# Patient Record
Sex: FEMALE | Race: WHITE | NOT HISPANIC OR LATINO | URBAN - METROPOLITAN AREA
[De-identification: names, ages, dates, MRNs, and addresses within clinical notes are randomized per-mention and may not be internally consistent; named-entity substitution may affect disease eponyms.]

---

## 2017-02-14 ENCOUNTER — OFFICE (OUTPATIENT)
Dept: URBAN - METROPOLITAN AREA CLINIC 11 | Facility: CLINIC | Age: 72
End: 2017-02-14
Payer: COMMERCIAL

## 2017-02-14 VITALS
HEART RATE: 63 BPM | WEIGHT: 164 LBS | SYSTOLIC BLOOD PRESSURE: 153 MMHG | DIASTOLIC BLOOD PRESSURE: 86 MMHG | HEIGHT: 64 IN | RESPIRATION RATE: 16 BRPM

## 2017-02-14 DIAGNOSIS — L29.8 OTHER PRURITUS: ICD-10-CM

## 2017-02-14 DIAGNOSIS — K74.3 PRIMARY BILIARY CIRRHOSIS: ICD-10-CM

## 2017-02-14 DIAGNOSIS — I25.10 ATHEROSCLEROTIC HEART DISEASE OF NATIVE CORONARY ARTERY WITH: ICD-10-CM

## 2017-02-14 DIAGNOSIS — E03.9 HYPOTHYROIDISM, UNSPECIFIED: ICD-10-CM

## 2017-02-14 DIAGNOSIS — N18.9 CHRONIC KIDNEY DISEASE, UNSPECIFIED: ICD-10-CM

## 2017-02-14 PROCEDURE — G8427 DOCREV CUR MEDS BY ELIG CLIN: HCPCS

## 2017-02-14 PROCEDURE — 99214 OFFICE O/P EST MOD 30 MIN: CPT

## 2017-02-14 NOTE — SERVICEHPINOTES
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, she had ran out of her hamlet her with her insurance and she is now back: She was doing well her tests were essentially normal but albumin 3.2 I could not figure out a Global  PBC score on her but her meld was low 8.5 however she had a heart attack in January and now is on Plavix and aspirin she said she one now but was not resuscitated was normal on the respirator now has some mild to moderate renal insufficiency looking at her blood urine is no peripheral extra protein and there were no red cells her GFR was 40 her renal function studies were normalin nov she has itching and a right which could be due to her PBC or her renal disease discussed using Sarna lotion may have to use urease very concerned will need to put the renal and biliary disease together

## 2017-02-17 LAB
BMP WITH ESTIMATED GFR,CALCULATED: CALCIUM TOTAL: 9.3 MG/DL (ref 8.5–10.5)
BMP WITH ESTIMATED GFR,CALCULATED: CARBON DIOXIDE: 26 MEQ/L (ref 21–31)
BMP WITH ESTIMATED GFR,CALCULATED: CHLORIDE: 107 MEQ/L — HIGH (ref 96–106)
BMP WITH ESTIMATED GFR,CALCULATED: CREATININE: 1.16 MG/DL (ref 0.6–1.3)
BMP WITH ESTIMATED GFR,CALCULATED: EGFR AFRICAN AMERICAN CALC: 55 ML/MIN/1.73M'2 — LOW
BMP WITH ESTIMATED GFR,CALCULATED: EGFR NON-AFRICAN AMERICAN CALC: 48 ML/MIN/1.73M'2 — LOW
BMP WITH ESTIMATED GFR,CALCULATED: FASTING/NON-FASTING: (no result)
BMP WITH ESTIMATED GFR,CALCULATED: GLUCOSE: 114 MG/DL — HIGH (ref 65–100)
BMP WITH ESTIMATED GFR,CALCULATED: POTASSIUM: 3.6 MEQ/ML (ref 3.5–5.4)
BMP WITH ESTIMATED GFR,CALCULATED: SODIUM: 146 MEQ/L — HIGH (ref 135–145)
BMP WITH ESTIMATED GFR,CALCULATED: UREA NITROGEN: 14 MG/DL (ref 8–23)
CBCI COMPLETE BLOOD COUNT W/ DIFF: ABS BASOPHILS: 0 K/UL (ref 0–0.3)
CBCI COMPLETE BLOOD COUNT W/ DIFF: ABS EOSINOPHILS: 0.2 K/UL (ref 0.05–0.5)
CBCI COMPLETE BLOOD COUNT W/ DIFF: ABS LYMPHOCYTES: 2.8 K/UL (ref 1–4)
CBCI COMPLETE BLOOD COUNT W/ DIFF: ABS MONOCYTES: 0.5 K/UL (ref 0.1–1.1)
CBCI COMPLETE BLOOD COUNT W/ DIFF: ABS NEUTROPHILS: 3.1 K/UL (ref 1.8–7)
CBCI COMPLETE BLOOD COUNT W/ DIFF: BASOPHILS: 0.5 % (ref 0–3)
CBCI COMPLETE BLOOD COUNT W/ DIFF: EOSINOPHILS: 3.5 % (ref 1–7)
CBCI COMPLETE BLOOD COUNT W/ DIFF: HEMATOCRIT: 35.4 % — LOW (ref 36–48)
CBCI COMPLETE BLOOD COUNT W/ DIFF: HEMOGLOBIN: 11.9 G/DL — LOW (ref 12–16)
CBCI COMPLETE BLOOD COUNT W/ DIFF: LYMPHOCYTES: 42 % (ref 20–48)
CBCI COMPLETE BLOOD COUNT W/ DIFF: MCH: 28.9 PG (ref 25–35)
CBCI COMPLETE BLOOD COUNT W/ DIFF: MCHC: 33.6 % (ref 30–38)
CBCI COMPLETE BLOOD COUNT W/ DIFF: MCV: 85.9 FL (ref 78–102)
CBCI COMPLETE BLOOD COUNT W/ DIFF: MONOCYTES: 7.5 % (ref 3–14)
CBCI COMPLETE BLOOD COUNT W/ DIFF: NEUTROPHILS: 46.5 % (ref 40–70)
CBCI COMPLETE BLOOD COUNT W/ DIFF: PLATELET COUNT: 245 K/UL (ref 150–450)
CBCI COMPLETE BLOOD COUNT W/ DIFF: RBC DISTRIBUTION WIDTH: 14.7 % (ref 11.5–16)
CBCI COMPLETE BLOOD COUNT W/ DIFF: RED BLOOD CELL COUNT: 4.12 M/UL (ref 4–5.5)
CBCI COMPLETE BLOOD COUNT W/ DIFF: WHITE BLOOD CELL COUNT: 6.7 K/UL (ref 4–11)
FLUORESCENT ANTINUCLEAR ANTIBODY: ANTINUCLEAR ANTIBODY: POSITIVE
FLUORESCENT ANTINUCLEAR ANTIBODY: PATTERN/TITER: (no result)
GAMMA GLUTAMYL TRANSFERASE: GGT: 22 U/L (ref 9–64)
HEPATIC FUNCTION PANEL A: ALBUMIN: 4 G/DL (ref 3.5–5.2)
HEPATIC FUNCTION PANEL A: ALKALINE PHOSPHATASE: 145 U/L — HIGH (ref 34–115)
HEPATIC FUNCTION PANEL A: DIRECT BILIRUBIN: 0.2 MG/DL (ref 0–0.2)
HEPATIC FUNCTION PANEL A: SGOT (AST): 24 U/L (ref 13–40)
HEPATIC FUNCTION PANEL A: SGPT (ALT): 18 U/L (ref 7–52)
HEPATIC FUNCTION PANEL A: TOTAL BILIRUBIN: 0.6 MG/DL (ref 0.3–1.2)
HEPATIC FUNCTION PANEL A: TOTAL PROTEIN: 7.2 G/DL (ref 6.4–8.3)
IMMUNOGLOBULIN G: 1166 MG/DL (ref 700–1600)
INSULIN: 16.7 UU/ML (ref 2.6–24.9)
Lab: (no result)
Lab: 1
PROTEIN ELECTROPHORESIS (SERUM): ALBUMIN: 3.21
PROTEIN ELECTROPHORESIS (SERUM): ALPHA-1: 0.33
PROTEIN ELECTROPHORESIS (SERUM): ALPHA-2: 0.83
PROTEIN ELECTROPHORESIS (SERUM): BETA: 1.04
PROTEIN ELECTROPHORESIS (SERUM): GAMMA: 1.45
PROTEIN ELECTROPHORESIS (SERUM): INTERPRETATION: (no result)
PROTEIN ELECTROPHORESIS (SERUM): TOTAL PROTEIN: 6.9
PROTHROMBIN TIME: INR VALUE: 0.99
PROTHROMBIN TIME: MEAN NORMAL: 13.1 SECONDS
PROTHROMBIN TIME: PATIENT: 13 SECONDS (ref 12.1–14.2)
URINALYSIS W/MICROSCOPIC: BACTERIA: (no result)
URINALYSIS W/MICROSCOPIC: BILIRUBIN: NEGATIVE
URINALYSIS W/MICROSCOPIC: BLOOD: ABNORMAL
URINALYSIS W/MICROSCOPIC: GLUCOSE: NEGATIVE
URINALYSIS W/MICROSCOPIC: HYALINE CAST: 1
URINALYSIS W/MICROSCOPIC: KETONES, URINE: NEGATIVE
URINALYSIS W/MICROSCOPIC: LEUKOCYTE ESTERASE: POSITIVE
URINALYSIS W/MICROSCOPIC: MUCOUS: (no result)
URINALYSIS W/MICROSCOPIC: NITRITES: NEGATIVE
URINALYSIS W/MICROSCOPIC: PH,URINE: 5 (ref 4.6–8.5)
URINALYSIS W/MICROSCOPIC: PROTEIN: NEGATIVE
URINALYSIS W/MICROSCOPIC: RBC: 5 CELLS/HPF — HIGH (ref 0–4)
URINALYSIS W/MICROSCOPIC: SPECIFIC GRAVITY: 1.02 (ref 1–1.03)
URINALYSIS W/MICROSCOPIC: SQUAMOUS EPITHELIAL CELL: 1 /HPF
URINALYSIS W/MICROSCOPIC: URINE CLARITY: (no result)
URINALYSIS W/MICROSCOPIC: URINE COLOR: YELLOW
URINALYSIS W/MICROSCOPIC: WBC: 97 CELLS/HPF — HIGH (ref 0–2)
URINE CULTURE: C URINE: (no result)

## 2017-03-31 ENCOUNTER — OFFICE (OUTPATIENT)
Dept: URBAN - METROPOLITAN AREA CLINIC 11 | Facility: CLINIC | Age: 72
End: 2017-03-31
Payer: COMMERCIAL

## 2017-03-31 VITALS
DIASTOLIC BLOOD PRESSURE: 90 MMHG | WEIGHT: 158 LBS | RESPIRATION RATE: 16 BRPM | SYSTOLIC BLOOD PRESSURE: 161 MMHG | HEART RATE: 60 BPM | HEIGHT: 64 IN

## 2017-03-31 DIAGNOSIS — K74.3 PRIMARY BILIARY CIRRHOSIS: ICD-10-CM

## 2017-03-31 DIAGNOSIS — R93.8 ABNORMAL FINDINGS ON DIAGNOSTIC IMAGING OF OTHER SPECIFIED B: ICD-10-CM

## 2017-03-31 DIAGNOSIS — N18.9 CHRONIC KIDNEY DISEASE, UNSPECIFIED: ICD-10-CM

## 2017-03-31 PROCEDURE — G8427 DOCREV CUR MEDS BY ELIG CLIN: HCPCS

## 2017-03-31 PROCEDURE — 99214 OFFICE O/P EST MOD 30 MIN: CPT

## 2017-04-02 LAB
ALPHA-FETOPROTEIN TUMOR MARKER: AFP - TUMOR MARKER: 2.8 NG/ML (ref 0–9)
BMP WITH ESTIMATED GFR,CALCULATED: CALCIUM TOTAL: 9 MG/DL (ref 8.5–10.5)
BMP WITH ESTIMATED GFR,CALCULATED: CARBON DIOXIDE: 29 MEQ/L (ref 21–31)
BMP WITH ESTIMATED GFR,CALCULATED: CHLORIDE: 102 MEQ/L (ref 96–106)
BMP WITH ESTIMATED GFR,CALCULATED: CREATININE: 1.28 MG/DL (ref 0.6–1.3)
BMP WITH ESTIMATED GFR,CALCULATED: EGFR AFRICAN AMERICAN CALC: 49 ML/MIN/1.73M'2 — LOW
BMP WITH ESTIMATED GFR,CALCULATED: EGFR NON-AFRICAN AMERICAN CALC: 42 ML/MIN/1.73M'2 — LOW
BMP WITH ESTIMATED GFR,CALCULATED: FASTING/NON-FASTING: (no result)
BMP WITH ESTIMATED GFR,CALCULATED: GLUCOSE: 97 MG/DL (ref 65–100)
BMP WITH ESTIMATED GFR,CALCULATED: POTASSIUM: 3.1 MEQ/ML — LOW (ref 3.5–5.4)
BMP WITH ESTIMATED GFR,CALCULATED: SODIUM: 143 MEQ/L (ref 135–145)
BMP WITH ESTIMATED GFR,CALCULATED: UREA NITROGEN: 14 MG/DL (ref 8–23)
CA 19-9: CA199 (ROCHE): 10 U/ML
CBCI COMPLETE BLOOD COUNT W/ DIFF: ABS BASOPHILS: 0 K/UL (ref 0–0.3)
CBCI COMPLETE BLOOD COUNT W/ DIFF: ABS EOSINOPHILS: 0.2 K/UL (ref 0.05–0.5)
CBCI COMPLETE BLOOD COUNT W/ DIFF: ABS LYMPHOCYTES: 2.8 K/UL (ref 1–4)
CBCI COMPLETE BLOOD COUNT W/ DIFF: ABS MONOCYTES: 0.4 K/UL (ref 0.1–1.1)
CBCI COMPLETE BLOOD COUNT W/ DIFF: ABS NEUTROPHILS: 2.3 K/UL (ref 1.8–7)
CBCI COMPLETE BLOOD COUNT W/ DIFF: BASOPHILS: 0.5 % (ref 0–3)
CBCI COMPLETE BLOOD COUNT W/ DIFF: EOSINOPHILS: 3.2 % (ref 1–7)
CBCI COMPLETE BLOOD COUNT W/ DIFF: HEMATOCRIT: 38.5 % (ref 36–48)
CBCI COMPLETE BLOOD COUNT W/ DIFF: HEMOGLOBIN: 12.4 G/DL (ref 12–16)
CBCI COMPLETE BLOOD COUNT W/ DIFF: LYMPHOCYTES: 49.8 % — HIGH (ref 20–48)
CBCI COMPLETE BLOOD COUNT W/ DIFF: MCH: 28 PG (ref 25–35)
CBCI COMPLETE BLOOD COUNT W/ DIFF: MCHC: 32.2 % (ref 30–38)
CBCI COMPLETE BLOOD COUNT W/ DIFF: MCV: 86.9 FL (ref 78–102)
CBCI COMPLETE BLOOD COUNT W/ DIFF: MONOCYTES: 6.9 % (ref 3–14)
CBCI COMPLETE BLOOD COUNT W/ DIFF: NEUTROPHILS: 39.6 % — LOW (ref 40–70)
CBCI COMPLETE BLOOD COUNT W/ DIFF: PLATELET COUNT: 248 K/UL (ref 150–450)
CBCI COMPLETE BLOOD COUNT W/ DIFF: RBC DISTRIBUTION WIDTH: 14.4 % (ref 11.5–16)
CBCI COMPLETE BLOOD COUNT W/ DIFF: RED BLOOD CELL COUNT: 4.43 M/UL (ref 4–5.5)
CBCI COMPLETE BLOOD COUNT W/ DIFF: WHITE BLOOD CELL COUNT: 5.7 K/UL (ref 4–11)
GAMMA GLUTAMYL TRANSFERASE: GGT: 19 U/L (ref 9–64)
HEPATIC FUNCTION PANEL A: ALBUMIN: 4.1 G/DL (ref 3.5–5.2)
HEPATIC FUNCTION PANEL A: ALKALINE PHOSPHATASE: 159 U/L — HIGH (ref 38–146)
HEPATIC FUNCTION PANEL A: DIRECT BILIRUBIN: 0.2 MG/DL (ref 0–0.2)
HEPATIC FUNCTION PANEL A: SGOT (AST): 23 U/L (ref 13–40)
HEPATIC FUNCTION PANEL A: SGPT (ALT): 15 U/L (ref 7–52)
HEPATIC FUNCTION PANEL A: TOTAL BILIRUBIN: 0.5 MG/DL (ref 0.3–1.2)
HEPATIC FUNCTION PANEL A: TOTAL PROTEIN: 7.2 G/DL (ref 6.4–8.3)
IMMUNOGLOBULIN M: 95 MG/DL (ref 40–230)
Lab: (no result) /HPF
Lab: <1
Lab: <1
PROTHROMBIN TIME: INR VALUE: 1.02
PROTHROMBIN TIME: MEAN NORMAL: 13.1 SECONDS
PROTHROMBIN TIME: PATIENT: 13.3 SECONDS (ref 12.1–14.2)
URINALYSIS W/MICROSCOPIC: BACTERIA: 0
URINALYSIS W/MICROSCOPIC: BILIRUBIN: NEGATIVE
URINALYSIS W/MICROSCOPIC: BLOOD: NEGATIVE
URINALYSIS W/MICROSCOPIC: GLUCOSE: NEGATIVE
URINALYSIS W/MICROSCOPIC: KETONES, URINE: NEGATIVE
URINALYSIS W/MICROSCOPIC: LEUKOCYTE ESTERASE: POSITIVE
URINALYSIS W/MICROSCOPIC: MUCOUS: (no result)
URINALYSIS W/MICROSCOPIC: NITRITES: NEGATIVE
URINALYSIS W/MICROSCOPIC: PH,URINE: 6 (ref 4.6–8.5)
URINALYSIS W/MICROSCOPIC: PROTEIN: NEGATIVE
URINALYSIS W/MICROSCOPIC: RBC: 1 CELLS/HPF (ref 0–4)
URINALYSIS W/MICROSCOPIC: SPECIFIC GRAVITY: 1.02 (ref 1–1.03)
URINALYSIS W/MICROSCOPIC: SQUAMOUS EPITHELIAL CELL: 1 /HPF
URINALYSIS W/MICROSCOPIC: URINE CLARITY: (no result)
URINALYSIS W/MICROSCOPIC: URINE COLOR: YELLOW
URINALYSIS W/MICROSCOPIC: WBC: 7 CELLS/HPF — HIGH (ref 0–2)

## 2017-07-07 ENCOUNTER — OFFICE (OUTPATIENT)
Dept: URBAN - METROPOLITAN AREA CLINIC 11 | Facility: CLINIC | Age: 72
End: 2017-07-07
Payer: COMMERCIAL

## 2017-07-07 VITALS
HEIGHT: 64 IN | DIASTOLIC BLOOD PRESSURE: 80 MMHG | HEART RATE: 62 BPM | WEIGHT: 153 LBS | SYSTOLIC BLOOD PRESSURE: 125 MMHG

## 2017-07-07 DIAGNOSIS — K74.3 PRIMARY BILIARY CIRRHOSIS: ICD-10-CM

## 2017-07-07 DIAGNOSIS — E03.9 HYPOTHYROIDISM, UNSPECIFIED: ICD-10-CM

## 2017-07-07 DIAGNOSIS — G47.429: ICD-10-CM

## 2017-07-07 DIAGNOSIS — N18.9 CHRONIC KIDNEY DISEASE, UNSPECIFIED: ICD-10-CM

## 2017-07-07 PROCEDURE — 99214 OFFICE O/P EST MOD 30 MIN: CPT

## 2017-07-07 PROCEDURE — G8427 DOCREV CUR MEDS BY ELIG CLIN: HCPCS

## 2017-07-08 LAB
BASIC METABOLIC PANEL: CALCIUM TOTAL: 9.1 MG/DL (ref 8.5–10.5)
BASIC METABOLIC PANEL: CARBON DIOXIDE: 29 MEQ/L (ref 21–31)
BASIC METABOLIC PANEL: CHLORIDE: 102 MEQ/L (ref 96–106)
BASIC METABOLIC PANEL: CREATININE: 1.12 MG/DL (ref 0.6–1.3)
BASIC METABOLIC PANEL: GLUCOSE: 122 MG/DL — HIGH (ref 65–100)
BASIC METABOLIC PANEL: POTASSIUM: 3.6 MEQ/ML (ref 3.5–5.4)
BASIC METABOLIC PANEL: SODIUM: 143 MEQ/L (ref 135–145)
BASIC METABOLIC PANEL: UREA NITROGEN: 12 MG/DL (ref 8–23)
CBCI COMPLETE BLOOD COUNT W/ DIFF: ABS BASOPHILS: 0 K/UL (ref 0–0.3)
CBCI COMPLETE BLOOD COUNT W/ DIFF: ABS EOSINOPHILS: 0.3 K/UL (ref 0.05–0.5)
CBCI COMPLETE BLOOD COUNT W/ DIFF: ABS LYMPHOCYTES: 3 K/UL (ref 1–4)
CBCI COMPLETE BLOOD COUNT W/ DIFF: ABS MONOCYTES: 0.7 K/UL (ref 0.1–1.1)
CBCI COMPLETE BLOOD COUNT W/ DIFF: ABS NEUTROPHILS: 1.8 K/UL (ref 1.8–7)
CBCI COMPLETE BLOOD COUNT W/ DIFF: BASOPHILS: 0.3 % (ref 0–3)
CBCI COMPLETE BLOOD COUNT W/ DIFF: EOSINOPHILS: 4.7 % (ref 1–7)
CBCI COMPLETE BLOOD COUNT W/ DIFF: HEMATOCRIT: 37.6 % (ref 36–48)
CBCI COMPLETE BLOOD COUNT W/ DIFF: HEMOGLOBIN: 12.1 G/DL (ref 12–16)
CBCI COMPLETE BLOOD COUNT W/ DIFF: LYMPHOCYTES: 52.3 % — HIGH (ref 20–48)
CBCI COMPLETE BLOOD COUNT W/ DIFF: MCH: 27.7 PG (ref 25–35)
CBCI COMPLETE BLOOD COUNT W/ DIFF: MCHC: 32.2 % (ref 30–38)
CBCI COMPLETE BLOOD COUNT W/ DIFF: MCV: 86 FL (ref 78–102)
CBCI COMPLETE BLOOD COUNT W/ DIFF: MONOCYTES: 11.2 % (ref 3–14)
CBCI COMPLETE BLOOD COUNT W/ DIFF: NEUTROPHILS: 31.5 % — LOW (ref 40–70)
CBCI COMPLETE BLOOD COUNT W/ DIFF: PLATELET COUNT: 278 K/UL (ref 150–450)
CBCI COMPLETE BLOOD COUNT W/ DIFF: RBC DISTRIBUTION WIDTH: 16 % (ref 11.5–16)
CBCI COMPLETE BLOOD COUNT W/ DIFF: RED BLOOD CELL COUNT: 4.37 M/UL (ref 4–5.5)
CBCI COMPLETE BLOOD COUNT W/ DIFF: WHITE BLOOD CELL COUNT: 5.8 K/UL (ref 4–11)
GAMMA GLUTAMYL TRANSFERASE: GGT: 15 U/L (ref 9–64)
HEPATIC FUNCTION PANEL A: ALBUMIN: 3.8 G/DL (ref 3.5–5.2)
HEPATIC FUNCTION PANEL A: ALKALINE PHOSPHATASE: 183 U/L — HIGH (ref 38–146)
HEPATIC FUNCTION PANEL A: DIRECT BILIRUBIN: 0.1 MG/DL (ref 0–0.2)
HEPATIC FUNCTION PANEL A: SGOT (AST): 23 U/L (ref 13–40)
HEPATIC FUNCTION PANEL A: SGPT (ALT): 15 U/L (ref 7–52)
HEPATIC FUNCTION PANEL A: TOTAL BILIRUBIN: 0.3 MG/DL (ref 0.3–1.2)
HEPATIC FUNCTION PANEL A: TOTAL PROTEIN: 6.6 G/DL (ref 6.4–8.3)
IMMUNOGLOBULIN M: 79 MG/DL (ref 40–230)
PROTHROMBIN TIME: INR VALUE: 0.98
PROTHROMBIN TIME: MEAN NORMAL: 13.1 SECONDS
PROTHROMBIN TIME: PATIENT: 12.8 SECONDS (ref 12.1–14.2)
T4-THYROXINE: T4 (THYROXINE): 8.8 UG/DL (ref 4.5–12)
THYROID STIMULATING HORMONE: TSH: 0.27 MIU/L — LOW (ref 0.3–4.2)

## 2017-10-09 ENCOUNTER — OFFICE (OUTPATIENT)
Dept: URBAN - METROPOLITAN AREA CLINIC 11 | Facility: CLINIC | Age: 72
End: 2017-10-09
Payer: COMMERCIAL

## 2017-10-09 VITALS
HEART RATE: 62 BPM | SYSTOLIC BLOOD PRESSURE: 150 MMHG | WEIGHT: 166 LBS | DIASTOLIC BLOOD PRESSURE: 84 MMHG | HEIGHT: 64 IN

## 2017-10-09 DIAGNOSIS — N18.9 CHRONIC KIDNEY DISEASE, UNSPECIFIED: ICD-10-CM

## 2017-10-09 DIAGNOSIS — E03.9 HYPOTHYROIDISM, UNSPECIFIED: ICD-10-CM

## 2017-10-09 DIAGNOSIS — K74.3 PRIMARY BILIARY CIRRHOSIS: ICD-10-CM

## 2017-10-09 DIAGNOSIS — K86.2 CYST OF PANCREAS: ICD-10-CM

## 2017-10-09 LAB
CBC WITH DIFFERENTIAL/PLATELET: BASO (ABSOLUTE): 0 X10E3/UL (ref 0–0.2)
CBC WITH DIFFERENTIAL/PLATELET: BASOS: 1 %
CBC WITH DIFFERENTIAL/PLATELET: EOS (ABSOLUTE): 0.3 X10E3/UL (ref 0–0.4)
CBC WITH DIFFERENTIAL/PLATELET: EOS: 5 %
CBC WITH DIFFERENTIAL/PLATELET: HEMATOCRIT: 36.1 % (ref 34–46.6)
CBC WITH DIFFERENTIAL/PLATELET: HEMOGLOBIN: 11.1 G/DL (ref 11.1–15.9)
CBC WITH DIFFERENTIAL/PLATELET: IMMATURE GRANS (ABS): 0 X10E3/UL (ref 0–0.1)
CBC WITH DIFFERENTIAL/PLATELET: IMMATURE GRANULOCYTES: 0 %
CBC WITH DIFFERENTIAL/PLATELET: LYMPHS (ABSOLUTE): 2.6 X10E3/UL (ref 0.7–3.1)
CBC WITH DIFFERENTIAL/PLATELET: LYMPHS: 44 %
CBC WITH DIFFERENTIAL/PLATELET: MCH: 27.2 PG (ref 26.6–33)
CBC WITH DIFFERENTIAL/PLATELET: MCHC: 30.7 G/DL — LOW (ref 31.5–35.7)
CBC WITH DIFFERENTIAL/PLATELET: MCV: 89 FL (ref 79–97)
CBC WITH DIFFERENTIAL/PLATELET: MONOCYTES(ABSOLUTE): 0.4 X10E3/UL (ref 0.1–0.9)
CBC WITH DIFFERENTIAL/PLATELET: MONOCYTES: 7 %
CBC WITH DIFFERENTIAL/PLATELET: NEUTROPHILS (ABSOLUTE): 2.6 X10E3/UL (ref 1.4–7)
CBC WITH DIFFERENTIAL/PLATELET: NEUTROPHILS: 43 %
CBC WITH DIFFERENTIAL/PLATELET: PLATELETS: 254 X10E3/UL (ref 150–379)
CBC WITH DIFFERENTIAL/PLATELET: RBC: 4.08 X10E6/UL (ref 3.77–5.28)
CBC WITH DIFFERENTIAL/PLATELET: RDW: 15.1 % (ref 12.3–15.4)
CBC WITH DIFFERENTIAL/PLATELET: WBC: 6 X10E3/UL (ref 3.4–10.8)
GGT: 18 IU/L (ref 0–60)
HEPATIC FUNCTION PANEL (7): ALBUMIN, SERUM: 3.6 G/DL (ref 3.5–4.8)
HEPATIC FUNCTION PANEL (7): ALKALINE PHOSPHATASE, S: 184 IU/L — HIGH (ref 39–117)
HEPATIC FUNCTION PANEL (7): ALT (SGPT): 21 IU/L (ref 0–32)
HEPATIC FUNCTION PANEL (7): AST (SGOT): 32 IU/L (ref 0–40)
HEPATIC FUNCTION PANEL (7): BILIRUBIN, DIRECT: 0.13 MG/DL (ref 0–0.4)
HEPATIC FUNCTION PANEL (7): BILIRUBIN, TOTAL: 0.4 MG/DL (ref 0–1.2)
HEPATIC FUNCTION PANEL (7): PROTEIN, TOTAL, SERUM: 6.9 G/DL (ref 6–8.5)
IMMUNOGLOBULIN M, QN, SERUM: 85 MG/DL (ref 26–217)
PROTHROMBIN TIME (PT): INR: 1 (ref 0.8–1.2)
PROTHROMBIN TIME (PT): PROTHROMBIN TIME: 9.9 SEC (ref 9.1–12)
VITAMIN D, 25-HYDROXY: 57.4 NG/ML (ref 30–100)

## 2017-10-09 PROCEDURE — 99214 OFFICE O/P EST MOD 30 MIN: CPT

## 2017-10-09 PROCEDURE — G8427 DOCREV CUR MEDS BY ELIG CLIN: HCPCS

## 2018-04-20 ENCOUNTER — OFFICE (OUTPATIENT)
Dept: URBAN - METROPOLITAN AREA CLINIC 11 | Facility: CLINIC | Age: 73
End: 2018-04-20
Payer: COMMERCIAL

## 2018-04-20 VITALS
HEIGHT: 64 IN | HEART RATE: 69 BPM | SYSTOLIC BLOOD PRESSURE: 127 MMHG | WEIGHT: 170 LBS | DIASTOLIC BLOOD PRESSURE: 82 MMHG

## 2018-04-20 DIAGNOSIS — K74.3 PRIMARY BILIARY CIRRHOSIS: ICD-10-CM

## 2018-04-20 DIAGNOSIS — K21.9 GASTRO-ESOPHAGEAL REFLUX DISEASE WITHOUT ESOPHAGITIS: ICD-10-CM

## 2018-04-20 DIAGNOSIS — E55.9 VITAMIN D DEFICIENCY, UNSPECIFIED: ICD-10-CM

## 2018-04-20 DIAGNOSIS — E03.9 HYPOTHYROIDISM, UNSPECIFIED: ICD-10-CM

## 2018-04-20 LAB
BASIC METABOLIC PANEL (8): BUN/CREATININE RATIO: 13 (ref 12–28)
BASIC METABOLIC PANEL (8): BUN: 16 MG/DL (ref 8–27)
BASIC METABOLIC PANEL (8): CALCIUM: 9.8 MG/DL (ref 8.7–10.3)
BASIC METABOLIC PANEL (8): CARBON DIOXIDE, TOTAL: 25 MMOL/L (ref 18–29)
BASIC METABOLIC PANEL (8): CHLORIDE: 107 MMOL/L — HIGH (ref 96–106)
BASIC METABOLIC PANEL (8): CREATININE: 1.19 MG/DL — HIGH (ref 0.57–1)
BASIC METABOLIC PANEL (8): EGFR IF AFRICN AM: 53 ML/MIN/1.73 — LOW (ref 59–?)
BASIC METABOLIC PANEL (8): EGFR IF NONAFRICN AM: 46 ML/MIN/1.73 — LOW (ref 59–?)
BASIC METABOLIC PANEL (8): GLUCOSE: 134 MG/DL — HIGH (ref 65–99)
BASIC METABOLIC PANEL (8): POTASSIUM: 4.4 MMOL/L (ref 3.5–5.2)
BASIC METABOLIC PANEL (8): SODIUM: 145 MMOL/L — HIGH (ref 134–144)
CBC WITH DIFFERENTIAL/PLATELET: BASO (ABSOLUTE): 0 X10E3/UL (ref 0–0.2)
CBC WITH DIFFERENTIAL/PLATELET: BASOS: 1 %
CBC WITH DIFFERENTIAL/PLATELET: EOS (ABSOLUTE): 0.2 X10E3/UL (ref 0–0.4)
CBC WITH DIFFERENTIAL/PLATELET: EOS: 3 %
CBC WITH DIFFERENTIAL/PLATELET: HEMATOCRIT: 41 % (ref 34–46.6)
CBC WITH DIFFERENTIAL/PLATELET: HEMOGLOBIN: 12.6 G/DL (ref 11.1–15.9)
CBC WITH DIFFERENTIAL/PLATELET: IMMATURE GRANS (ABS): 0 X10E3/UL (ref 0–0.1)
CBC WITH DIFFERENTIAL/PLATELET: IMMATURE GRANULOCYTES: 0 %
CBC WITH DIFFERENTIAL/PLATELET: LYMPHS (ABSOLUTE): 3 X10E3/UL (ref 0.7–3.1)
CBC WITH DIFFERENTIAL/PLATELET: LYMPHS: 41 %
CBC WITH DIFFERENTIAL/PLATELET: MCH: 27.9 PG (ref 26.6–33)
CBC WITH DIFFERENTIAL/PLATELET: MCHC: 30.7 G/DL — LOW (ref 31.5–35.7)
CBC WITH DIFFERENTIAL/PLATELET: MCV: 91 FL (ref 79–97)
CBC WITH DIFFERENTIAL/PLATELET: MONOCYTES(ABSOLUTE): 0.5 X10E3/UL (ref 0.1–0.9)
CBC WITH DIFFERENTIAL/PLATELET: MONOCYTES: 7 %
CBC WITH DIFFERENTIAL/PLATELET: NEUTROPHILS (ABSOLUTE): 3.6 X10E3/UL (ref 1.4–7)
CBC WITH DIFFERENTIAL/PLATELET: NEUTROPHILS: 48 %
CBC WITH DIFFERENTIAL/PLATELET: PLATELETS: 303 X10E3/UL (ref 150–379)
CBC WITH DIFFERENTIAL/PLATELET: RBC: 4.51 X10E6/UL (ref 3.77–5.28)
CBC WITH DIFFERENTIAL/PLATELET: RDW: 15.8 % — HIGH (ref 12.3–15.4)
CBC WITH DIFFERENTIAL/PLATELET: WBC: 7.3 X10E3/UL (ref 3.4–10.8)
GGT: 23 IU/L (ref 0–60)
HEPATIC FUNCTION PANEL (7): ALBUMIN: 3.9 G/DL (ref 3.5–4.8)
HEPATIC FUNCTION PANEL (7): ALKALINE PHOSPHATASE: 171 IU/L — HIGH (ref 39–117)
HEPATIC FUNCTION PANEL (7): ALT (SGPT): 21 IU/L (ref 0–32)
HEPATIC FUNCTION PANEL (7): AST (SGOT): 37 IU/L (ref 0–40)
HEPATIC FUNCTION PANEL (7): BILIRUBIN, DIRECT: 0.15 MG/DL (ref 0–0.4)
HEPATIC FUNCTION PANEL (7): BILIRUBIN, TOTAL: 0.4 MG/DL (ref 0–1.2)
HEPATIC FUNCTION PANEL (7): PROTEIN, TOTAL: 6.7 G/DL (ref 6–8.5)
IMMUNOGLOBULIN M, QN, SERUM: 101 MG/DL (ref 26–217)
PROTHROMBIN TIME (PT): INR: 1 (ref 0.8–1.2)
PROTHROMBIN TIME (PT): PROTHROMBIN TIME: 10.1 SEC (ref 9.1–12)
VITAMIN D, 25-HYDROXY: 59.3 NG/ML (ref 30–100)

## 2018-04-20 PROCEDURE — 99214 OFFICE O/P EST MOD 30 MIN: CPT

## 2018-04-20 RX ORDER — URSODIOL 250 MG/1
1500 TABLET ORAL
Qty: 540 | Refills: 3 | Status: ACTIVE

## 2018-10-31 ENCOUNTER — OFFICE (OUTPATIENT)
Dept: URBAN - METROPOLITAN AREA CLINIC 8 | Facility: CLINIC | Age: 73
End: 2018-10-31
Payer: COMMERCIAL

## 2018-10-31 VITALS
DIASTOLIC BLOOD PRESSURE: 80 MMHG | WEIGHT: 169 LBS | HEIGHT: 64 IN | HEART RATE: 63 BPM | SYSTOLIC BLOOD PRESSURE: 132 MMHG

## 2018-10-31 DIAGNOSIS — K21.9 GASTRO-ESOPHAGEAL REFLUX DISEASE WITHOUT ESOPHAGITIS: ICD-10-CM

## 2018-10-31 DIAGNOSIS — K74.3 PRIMARY BILIARY CIRRHOSIS: ICD-10-CM

## 2018-10-31 DIAGNOSIS — L29.9 PRURITUS, UNSPECIFIED: ICD-10-CM

## 2018-10-31 DIAGNOSIS — K52.89 OTHER SPECIFIED NONINFECTIVE GASTROENTERITIS AND COLITIS: ICD-10-CM

## 2018-10-31 DIAGNOSIS — K75.81 NONALCOHOLIC STEATOHEPATITIS (NASH): ICD-10-CM

## 2018-10-31 DIAGNOSIS — K86.2 CYST OF PANCREAS: ICD-10-CM

## 2018-10-31 LAB
AFP, SERUM, TUMOR MARKER: 3 NG/ML (ref 0–8.3)
MITOCHONDRIAL (M2) ANTIBODY: 88.9 UNITS — HIGH (ref 0–20)
PROTHROMBIN TIME (PT): INR: 1 (ref 0.8–1.2)
PROTHROMBIN TIME (PT): PROTHROMBIN TIME: 10.6 SEC (ref 9.1–12)

## 2018-10-31 PROCEDURE — 36415 COLL VENOUS BLD VENIPUNCTURE: CPT | Performed by: INTERNAL MEDICINE

## 2018-10-31 PROCEDURE — 99214 OFFICE O/P EST MOD 30 MIN: CPT | Performed by: INTERNAL MEDICINE

## 2018-11-01 LAB
NO TEST INDICATED: .: (no result)
NO TEST INDICATED: DEAR DOCTOR,: (no result)

## 2019-02-12 ENCOUNTER — AMBULATORY SURGICAL CENTER (OUTPATIENT)
Dept: URBAN - METROPOLITAN AREA SURGERY 3 | Facility: SURGERY | Age: 74
End: 2019-02-12
Payer: COMMERCIAL

## 2019-02-12 VITALS
HEIGHT: 64 IN | RESPIRATION RATE: 17 BRPM | SYSTOLIC BLOOD PRESSURE: 144 MMHG | SYSTOLIC BLOOD PRESSURE: 128 MMHG | SYSTOLIC BLOOD PRESSURE: 128 MMHG | WEIGHT: 160 LBS | RESPIRATION RATE: 24 BRPM | HEART RATE: 88 BPM | SYSTOLIC BLOOD PRESSURE: 120 MMHG | TEMPERATURE: 98.4 F | OXYGEN SATURATION: 97 % | OXYGEN SATURATION: 96 % | OXYGEN SATURATION: 98 % | RESPIRATION RATE: 17 BRPM | HEART RATE: 75 BPM | RESPIRATION RATE: 18 BRPM | HEIGHT: 64 IN | DIASTOLIC BLOOD PRESSURE: 75 MMHG | SYSTOLIC BLOOD PRESSURE: 126 MMHG | DIASTOLIC BLOOD PRESSURE: 77 MMHG | RESPIRATION RATE: 14 BRPM | OXYGEN SATURATION: 93 % | HEART RATE: 75 BPM | OXYGEN SATURATION: 96 % | SYSTOLIC BLOOD PRESSURE: 135 MMHG | RESPIRATION RATE: 14 BRPM | DIASTOLIC BLOOD PRESSURE: 74 MMHG | SYSTOLIC BLOOD PRESSURE: 120 MMHG | SYSTOLIC BLOOD PRESSURE: 135 MMHG | SYSTOLIC BLOOD PRESSURE: 144 MMHG | TEMPERATURE: 98 F | HEART RATE: 81 BPM | OXYGEN SATURATION: 98 % | TEMPERATURE: 98 F | RESPIRATION RATE: 21 BRPM | HEART RATE: 81 BPM | DIASTOLIC BLOOD PRESSURE: 70 MMHG | HEART RATE: 88 BPM | OXYGEN SATURATION: 97 % | HEART RATE: 68 BPM | RESPIRATION RATE: 24 BRPM | RESPIRATION RATE: 18 BRPM | DIASTOLIC BLOOD PRESSURE: 85 MMHG | OXYGEN SATURATION: 93 % | DIASTOLIC BLOOD PRESSURE: 85 MMHG | DIASTOLIC BLOOD PRESSURE: 77 MMHG | DIASTOLIC BLOOD PRESSURE: 74 MMHG | SYSTOLIC BLOOD PRESSURE: 126 MMHG | HEART RATE: 78 BPM | HEART RATE: 68 BPM | TEMPERATURE: 98.4 F | DIASTOLIC BLOOD PRESSURE: 75 MMHG | DIASTOLIC BLOOD PRESSURE: 70 MMHG | HEART RATE: 78 BPM | RESPIRATION RATE: 21 BRPM | WEIGHT: 160 LBS

## 2019-02-12 DIAGNOSIS — K44.9 DIAPHRAGMATIC HERNIA WITHOUT OBSTRUCTION OR GANGRENE: ICD-10-CM

## 2019-02-12 DIAGNOSIS — K21.9 GASTRO-ESOPHAGEAL REFLUX DISEASE WITHOUT ESOPHAGITIS: ICD-10-CM

## 2019-02-12 DIAGNOSIS — K74.3 PRIMARY BILIARY CIRRHOSIS: ICD-10-CM

## 2019-02-12 PROCEDURE — G8907 PT DOC NO EVENTS ON DISCHARG: HCPCS | Performed by: INTERNAL MEDICINE

## 2019-02-12 PROCEDURE — G8918 PT W/O PREOP ORDER IV AB PRO: HCPCS | Performed by: INTERNAL MEDICINE

## 2019-02-12 PROCEDURE — 43235 EGD DIAGNOSTIC BRUSH WASH: CPT | Performed by: INTERNAL MEDICINE

## 2019-05-13 ENCOUNTER — OFFICE (OUTPATIENT)
Dept: URBAN - METROPOLITAN AREA CLINIC 11 | Facility: CLINIC | Age: 74
End: 2019-05-13
Payer: COMMERCIAL

## 2019-05-13 VITALS
HEART RATE: 68 BPM | SYSTOLIC BLOOD PRESSURE: 120 MMHG | WEIGHT: 154 LBS | HEIGHT: 64 IN | DIASTOLIC BLOOD PRESSURE: 78 MMHG

## 2019-05-13 DIAGNOSIS — K86.2 CYST OF PANCREAS: ICD-10-CM

## 2019-05-13 DIAGNOSIS — K52.9 NONINFECTIVE GASTROENTERITIS AND COLITIS, UNSPECIFIED: ICD-10-CM

## 2019-05-13 DIAGNOSIS — K21.9 GASTRO-ESOPHAGEAL REFLUX DISEASE WITHOUT ESOPHAGITIS: ICD-10-CM

## 2019-05-13 DIAGNOSIS — K74.3 PRIMARY BILIARY CIRRHOSIS: ICD-10-CM

## 2019-05-13 PROCEDURE — 99213 OFFICE O/P EST LOW 20 MIN: CPT | Performed by: INTERNAL MEDICINE

## 2019-05-13 RX ORDER — URSODIOL 250 MG/1
1500 TABLET ORAL
Qty: 540 | Refills: 3 | Status: ACTIVE

## 2019-11-13 ENCOUNTER — OFFICE (OUTPATIENT)
Dept: URBAN - METROPOLITAN AREA CLINIC 8 | Facility: CLINIC | Age: 74
End: 2019-11-13
Payer: COMMERCIAL

## 2019-11-13 VITALS
DIASTOLIC BLOOD PRESSURE: 76 MMHG | WEIGHT: 164 LBS | HEIGHT: 64 IN | HEART RATE: 74 BPM | SYSTOLIC BLOOD PRESSURE: 114 MMHG

## 2019-11-13 DIAGNOSIS — K75.81 NONALCOHOLIC STEATOHEPATITIS (NASH): ICD-10-CM

## 2019-11-13 DIAGNOSIS — K74.3 PRIMARY BILIARY CIRRHOSIS: ICD-10-CM

## 2019-11-13 DIAGNOSIS — K86.2 CYST OF PANCREAS: ICD-10-CM

## 2019-11-13 DIAGNOSIS — K21.9 GASTRO-ESOPHAGEAL REFLUX DISEASE WITHOUT ESOPHAGITIS: ICD-10-CM

## 2019-11-13 LAB
BASIC METABOLIC PANEL (8): BUN/CREATININE RATIO: 17 (ref 12–28)
BASIC METABOLIC PANEL (8): BUN: 17 MG/DL (ref 8–27)
BASIC METABOLIC PANEL (8): CALCIUM: 9.3 MG/DL (ref 8.7–10.3)
BASIC METABOLIC PANEL (8): CARBON DIOXIDE, TOTAL: 20 MMOL/L (ref 20–29)
BASIC METABOLIC PANEL (8): CHLORIDE: 107 MMOL/L — HIGH (ref 96–106)
BASIC METABOLIC PANEL (8): CREATININE: 1.01 MG/DL — HIGH (ref 0.57–1)
BASIC METABOLIC PANEL (8): EGFR IF AFRICN AM: 64 ML/MIN/1.73 (ref 59–?)
BASIC METABOLIC PANEL (8): EGFR IF NONAFRICN AM: 56 ML/MIN/1.73 — LOW (ref 59–?)
BASIC METABOLIC PANEL (8): GLUCOSE: 124 MG/DL — HIGH (ref 65–99)
BASIC METABOLIC PANEL (8): POTASSIUM: 4.6 MMOL/L (ref 3.5–5.2)
BASIC METABOLIC PANEL (8): SODIUM: 145 MMOL/L — HIGH (ref 134–144)
HEPATIC FUNCTION PANEL (7): ALBUMIN: 3.8 G/DL (ref 3.5–4.8)
HEPATIC FUNCTION PANEL (7): ALKALINE PHOSPHATASE: 151 IU/L — HIGH (ref 39–117)
HEPATIC FUNCTION PANEL (7): ALT (SGPT): 29 IU/L (ref 0–32)
HEPATIC FUNCTION PANEL (7): AST (SGOT): 46 IU/L — HIGH (ref 0–40)
HEPATIC FUNCTION PANEL (7): BILIRUBIN, DIRECT: 0.12 MG/DL (ref 0–0.4)
HEPATIC FUNCTION PANEL (7): BILIRUBIN, TOTAL: 0.2 MG/DL (ref 0–1.2)
HEPATIC FUNCTION PANEL (7): PROTEIN, TOTAL: 6.8 G/DL (ref 6–8.5)

## 2019-11-13 PROCEDURE — 99213 OFFICE O/P EST LOW 20 MIN: CPT | Performed by: INTERNAL MEDICINE

## 2019-11-13 PROCEDURE — 36415 COLL VENOUS BLD VENIPUNCTURE: CPT | Performed by: INTERNAL MEDICINE

## 2020-01-29 ENCOUNTER — OFFICE (OUTPATIENT)
Dept: URBAN - METROPOLITAN AREA CLINIC 8 | Facility: CLINIC | Age: 75
End: 2020-01-29
Payer: COMMERCIAL

## 2020-01-29 VITALS
SYSTOLIC BLOOD PRESSURE: 126 MMHG | HEIGHT: 64 IN | HEART RATE: 71 BPM | WEIGHT: 164 LBS | DIASTOLIC BLOOD PRESSURE: 85 MMHG

## 2020-01-29 DIAGNOSIS — K74.3 PRIMARY BILIARY CIRRHOSIS: ICD-10-CM

## 2020-01-29 DIAGNOSIS — K86.2 CYST OF PANCREAS: ICD-10-CM

## 2020-01-29 DIAGNOSIS — K21.9 GASTRO-ESOPHAGEAL REFLUX DISEASE WITHOUT ESOPHAGITIS: ICD-10-CM

## 2020-01-29 LAB
BASIC METABOLIC PANEL (8): BUN/CREATININE RATIO: 19 (ref 12–28)
BASIC METABOLIC PANEL (8): BUN: 18 MG/DL (ref 8–27)
BASIC METABOLIC PANEL (8): CALCIUM: 9.3 MG/DL (ref 8.7–10.3)
BASIC METABOLIC PANEL (8): CARBON DIOXIDE, TOTAL: 23 MMOL/L (ref 20–29)
BASIC METABOLIC PANEL (8): CHLORIDE: 105 MMOL/L (ref 96–106)
BASIC METABOLIC PANEL (8): CREATININE: 0.95 MG/DL (ref 0.57–1)
BASIC METABOLIC PANEL (8): EGFR IF AFRICN AM: 69 ML/MIN/1.73 (ref 59–?)
BASIC METABOLIC PANEL (8): EGFR IF NONAFRICN AM: 60 ML/MIN/1.73 (ref 59–?)
BASIC METABOLIC PANEL (8): GLUCOSE: 118 MG/DL — HIGH (ref 65–99)
BASIC METABOLIC PANEL (8): POTASSIUM: 4.3 MMOL/L (ref 3.5–5.2)
BASIC METABOLIC PANEL (8): SODIUM: 144 MMOL/L (ref 134–144)
CBC, PLATELET, NO DIFFERENTIAL: HEMATOCRIT: 37.8 % (ref 34–46.6)
CBC, PLATELET, NO DIFFERENTIAL: HEMOGLOBIN: 12.2 G/DL (ref 11.1–15.9)
CBC, PLATELET, NO DIFFERENTIAL: MCH: 28.8 PG (ref 26.6–33)
CBC, PLATELET, NO DIFFERENTIAL: MCHC: 32.3 G/DL (ref 31.5–35.7)
CBC, PLATELET, NO DIFFERENTIAL: MCV: 89 FL (ref 79–97)
CBC, PLATELET, NO DIFFERENTIAL: PLATELETS: 227 X10E3/UL (ref 150–450)
CBC, PLATELET, NO DIFFERENTIAL: RBC: 4.24 X10E6/UL (ref 3.77–5.28)
CBC, PLATELET, NO DIFFERENTIAL: RDW: 15.9 % — HIGH (ref 11.7–15.4)
CBC, PLATELET, NO DIFFERENTIAL: WBC: 6.8 X10E3/UL (ref 3.4–10.8)
HEPATIC FUNCTION PANEL (7): ALBUMIN: 3.9 G/DL (ref 3.7–4.7)
HEPATIC FUNCTION PANEL (7): ALKALINE PHOSPHATASE: 137 IU/L — HIGH (ref 39–117)
HEPATIC FUNCTION PANEL (7): ALT (SGPT): 20 IU/L (ref 0–32)
HEPATIC FUNCTION PANEL (7): AST (SGOT): 36 IU/L (ref 0–40)
HEPATIC FUNCTION PANEL (7): BILIRUBIN, DIRECT: 0.14 MG/DL (ref 0–0.4)
HEPATIC FUNCTION PANEL (7): BILIRUBIN, TOTAL: 0.3 MG/DL (ref 0–1.2)
HEPATIC FUNCTION PANEL (7): PROTEIN, TOTAL: 6.8 G/DL (ref 6–8.5)

## 2020-01-29 PROCEDURE — 99214 OFFICE O/P EST MOD 30 MIN: CPT | Performed by: INTERNAL MEDICINE

## 2020-01-29 RX ORDER — OMEPRAZOLE 40 MG/1
CAPSULE, DELAYED RELEASE ORAL
Qty: 90 | Refills: 3 | Status: ACTIVE

## 2020-07-29 ENCOUNTER — OFFICE (OUTPATIENT)
Dept: URBAN - METROPOLITAN AREA CLINIC 11 | Facility: CLINIC | Age: 75
End: 2020-07-29
Payer: COMMERCIAL

## 2020-07-29 VITALS
WEIGHT: 162 LBS | HEIGHT: 64 IN | HEART RATE: 74 BPM | DIASTOLIC BLOOD PRESSURE: 81 MMHG | SYSTOLIC BLOOD PRESSURE: 124 MMHG

## 2020-07-29 DIAGNOSIS — K74.3 PRIMARY BILIARY CIRRHOSIS: ICD-10-CM

## 2020-07-29 DIAGNOSIS — K21.9 GASTRO-ESOPHAGEAL REFLUX DISEASE WITHOUT ESOPHAGITIS: ICD-10-CM

## 2020-07-29 DIAGNOSIS — R19.7 DIARRHEA, UNSPECIFIED: ICD-10-CM

## 2020-07-29 PROBLEM — K44.9 DIAPHRAGMATIC HERNIA WITHOUT OBSTRUCTION OR GANGRENE: Status: INACTIVE | Noted: 2019-02-12

## 2020-07-29 LAB
CBC, PLATELET, NO DIFFERENTIAL: HEMATOCRIT: 40.5 % (ref 34–46.6)
CBC, PLATELET, NO DIFFERENTIAL: HEMOGLOBIN: 12.3 G/DL (ref 11.1–15.9)
CBC, PLATELET, NO DIFFERENTIAL: MCH: 26.5 PG — LOW (ref 26.6–33)
CBC, PLATELET, NO DIFFERENTIAL: MCHC: 30.4 G/DL — LOW (ref 31.5–35.7)
CBC, PLATELET, NO DIFFERENTIAL: MCV: 87 FL (ref 79–97)
CBC, PLATELET, NO DIFFERENTIAL: PLATELETS: 239 X10E3/UL (ref 150–450)
CBC, PLATELET, NO DIFFERENTIAL: RBC: 4.64 X10E6/UL (ref 3.77–5.28)
CBC, PLATELET, NO DIFFERENTIAL: RDW: 14.6 % (ref 11.7–15.4)
CBC, PLATELET, NO DIFFERENTIAL: WBC: 7.2 X10E3/UL (ref 3.4–10.8)
COMP. METABOLIC PANEL (14): A/G RATIO: 1.4 (ref 1.2–2.2)
COMP. METABOLIC PANEL (14): ALBUMIN: 4 G/DL (ref 3.7–4.7)
COMP. METABOLIC PANEL (14): ALKALINE PHOSPHATASE: 163 IU/L — HIGH (ref 39–117)
COMP. METABOLIC PANEL (14): ALT (SGPT): 22 IU/L (ref 0–32)
COMP. METABOLIC PANEL (14): AST (SGOT): 33 IU/L (ref 0–40)
COMP. METABOLIC PANEL (14): BILIRUBIN, TOTAL: 0.5 MG/DL (ref 0–1.2)
COMP. METABOLIC PANEL (14): BUN/CREATININE RATIO: 13 (ref 12–28)
COMP. METABOLIC PANEL (14): BUN: 16 MG/DL (ref 8–27)
COMP. METABOLIC PANEL (14): CALCIUM: 9.5 MG/DL (ref 8.7–10.3)
COMP. METABOLIC PANEL (14): CARBON DIOXIDE, TOTAL: 24 MMOL/L (ref 20–29)
COMP. METABOLIC PANEL (14): CHLORIDE: 105 MMOL/L (ref 96–106)
COMP. METABOLIC PANEL (14): CREATININE: 1.25 MG/DL — HIGH (ref 0.57–1)
COMP. METABOLIC PANEL (14): EGFR IF AFRICN AM: 49 ML/MIN/1.73 — LOW (ref 59–?)
COMP. METABOLIC PANEL (14): EGFR IF NONAFRICN AM: 43 ML/MIN/1.73 — LOW (ref 59–?)
COMP. METABOLIC PANEL (14): GLOBULIN, TOTAL: 2.8 G/DL (ref 1.5–4.5)
COMP. METABOLIC PANEL (14): GLUCOSE: 88 MG/DL (ref 65–99)
COMP. METABOLIC PANEL (14): POTASSIUM: 4.7 MMOL/L (ref 3.5–5.2)
COMP. METABOLIC PANEL (14): PROTEIN, TOTAL: 6.8 G/DL (ref 6–8.5)
COMP. METABOLIC PANEL (14): SODIUM: 141 MMOL/L (ref 134–144)

## 2020-07-29 PROCEDURE — 99214 OFFICE O/P EST MOD 30 MIN: CPT | Performed by: INTERNAL MEDICINE

## 2020-07-31 ENCOUNTER — OFFICE (OUTPATIENT)
Dept: URBAN - METROPOLITAN AREA CLINIC 11 | Facility: CLINIC | Age: 75
End: 2020-07-31

## 2020-07-31 LAB
C DIFFICILE TOXINS A+B, EIA: NEGATIVE
GIARDIA LAMBLIA AG, EIA: NEGATIVE
RESULT: RESULT 1: (no result)
RESULT: RESULT 1: (no result)
STOOL CULTURE: CAMPYLOBACTER CULTURE: (no result)
STOOL CULTURE: E COLI SHIGA TOXIN EIA: NEGATIVE
STOOL CULTURE: SALMONELLA/SHIGELLA SCREEN: (no result)

## 2020-11-16 ENCOUNTER — OFFICE (OUTPATIENT)
Dept: URBAN - METROPOLITAN AREA CLINIC 8 | Facility: CLINIC | Age: 75
End: 2020-11-16
Payer: COMMERCIAL

## 2020-11-16 VITALS
WEIGHT: 169 LBS | SYSTOLIC BLOOD PRESSURE: 149 MMHG | DIASTOLIC BLOOD PRESSURE: 76 MMHG | OXYGEN SATURATION: 97 % | HEIGHT: 64 IN | HEART RATE: 67 BPM

## 2020-11-16 DIAGNOSIS — L29.9 PRURITUS, UNSPECIFIED: ICD-10-CM

## 2020-11-16 DIAGNOSIS — K52.9 NONINFECTIVE GASTROENTERITIS AND COLITIS, UNSPECIFIED: ICD-10-CM

## 2020-11-16 DIAGNOSIS — K21.9 GASTRO-ESOPHAGEAL REFLUX DISEASE WITHOUT ESOPHAGITIS: ICD-10-CM

## 2020-11-16 DIAGNOSIS — K74.3 PRIMARY BILIARY CIRRHOSIS: ICD-10-CM

## 2020-11-16 DIAGNOSIS — K75.81 NONALCOHOLIC STEATOHEPATITIS (NASH): ICD-10-CM

## 2020-11-16 DIAGNOSIS — K86.2 CYST OF PANCREAS: ICD-10-CM

## 2020-11-16 LAB
COMP. METABOLIC PANEL (14): A/G RATIO: 1.1 — LOW (ref 1.2–2.2)
COMP. METABOLIC PANEL (14): ALBUMIN: 3.4 G/DL — LOW (ref 3.7–4.7)
COMP. METABOLIC PANEL (14): ALKALINE PHOSPHATASE: 179 IU/L — HIGH (ref 39–117)
COMP. METABOLIC PANEL (14): ALT (SGPT): 33 IU/L — HIGH (ref 0–32)
COMP. METABOLIC PANEL (14): AST (SGOT): 68 IU/L — HIGH (ref 0–40)
COMP. METABOLIC PANEL (14): BILIRUBIN, TOTAL: 0.5 MG/DL (ref 0–1.2)
COMP. METABOLIC PANEL (14): BUN/CREATININE RATIO: 12 (ref 12–28)
COMP. METABOLIC PANEL (14): BUN: 12 MG/DL (ref 8–27)
COMP. METABOLIC PANEL (14): CALCIUM: 9.1 MG/DL (ref 8.7–10.3)
COMP. METABOLIC PANEL (14): CARBON DIOXIDE, TOTAL: 26 MMOL/L (ref 20–29)
COMP. METABOLIC PANEL (14): CHLORIDE: 104 MMOL/L (ref 96–106)
COMP. METABOLIC PANEL (14): CREATININE: 1.02 MG/DL — HIGH (ref 0.57–1)
COMP. METABOLIC PANEL (14): EGFR IF AFRICN AM: 63 ML/MIN/1.73 (ref 59–?)
COMP. METABOLIC PANEL (14): EGFR IF NONAFRICN AM: 54 ML/MIN/1.73 — LOW (ref 59–?)
COMP. METABOLIC PANEL (14): GLOBULIN, TOTAL: 3.2 G/DL (ref 1.5–4.5)
COMP. METABOLIC PANEL (14): GLUCOSE: 124 MG/DL — HIGH (ref 65–99)
COMP. METABOLIC PANEL (14): POTASSIUM: 4 MMOL/L (ref 3.5–5.2)
COMP. METABOLIC PANEL (14): PROTEIN, TOTAL: 6.6 G/DL (ref 6–8.5)
COMP. METABOLIC PANEL (14): SODIUM: 143 MMOL/L (ref 134–144)

## 2020-11-16 PROCEDURE — 99214 OFFICE O/P EST MOD 30 MIN: CPT | Performed by: INTERNAL MEDICINE

## 2020-11-16 RX ORDER — COLESTIPOL HYDROCHLORIDE 1 G/1
2 TABLET ORAL
Qty: 180 | Refills: 4 | Status: COMPLETED
Start: 2020-11-16 | End: 2021-07-26

## 2020-11-16 NOTE — SERVICEHPINOTES
Ms. Jones presents today for follow up of diarrhea. She was treated with Budesonide. She hasn't taken it in approximately 1.5 months. She notes that she is having normal bowel movements twice per day. Stools are brown and formed. She denies hematochezia. She has a history of PBC. She denies abdominal pain. She denies scleral icterus and darkening of the urine. She denies yellowing of the skin. She denies extremity swelling and abdominal swelling. She denies changes in her mental clarity and sleep. She notes that she is itching all over. She notes that it has recently gotten worse over the past few months.She has a history of GERD. She denies retrosternal burning, regurgitation, dysphagia, and epigastric pain. She notes symptoms are well controlled on Omeprazole daily. She denies melena. She has a history of a cystic pancreatic lesion that was stable in size from 2017 to 2019. MD NOTE:  1-2 months of itching involving the BUE and BLE with no truncal involvement.  Palms and soles of feet not involved.  No new meds or household chemicals prior to onset of pruritus and little improvement with cyproheptadine.  Symptoms primarily nocturnal.  No change in urine or eye color.BR

## 2020-11-16 NOTE — SERVICENOTES
Patient seen and examined independently by Dr. Cotto who discussed patient and developed impression and plan with MARIO Ohara

## 2021-05-10 ENCOUNTER — OFFICE (OUTPATIENT)
Dept: URBAN - METROPOLITAN AREA CLINIC 8 | Facility: CLINIC | Age: 76
End: 2021-05-10
Payer: MEDICARE

## 2021-05-10 VITALS
WEIGHT: 164 LBS | SYSTOLIC BLOOD PRESSURE: 146 MMHG | HEART RATE: 72 BPM | OXYGEN SATURATION: 98 % | DIASTOLIC BLOOD PRESSURE: 77 MMHG | HEIGHT: 64 IN

## 2021-05-10 DIAGNOSIS — L29.9 PRURITUS, UNSPECIFIED: ICD-10-CM

## 2021-05-10 DIAGNOSIS — K52.9 NONINFECTIVE GASTROENTERITIS AND COLITIS, UNSPECIFIED: ICD-10-CM

## 2021-05-10 DIAGNOSIS — K74.3 PRIMARY BILIARY CIRRHOSIS: ICD-10-CM

## 2021-05-10 DIAGNOSIS — K75.81 NONALCOHOLIC STEATOHEPATITIS (NASH): ICD-10-CM

## 2021-05-10 DIAGNOSIS — K86.2 CYST OF PANCREAS: ICD-10-CM

## 2021-05-10 DIAGNOSIS — K21.9 GASTRO-ESOPHAGEAL REFLUX DISEASE WITHOUT ESOPHAGITIS: ICD-10-CM

## 2021-05-10 LAB
CBC, PLATELET, NO DIFFERENTIAL: HEMATOCRIT: 36.4 % (ref 34–46.6)
CBC, PLATELET, NO DIFFERENTIAL: HEMOGLOBIN: 10.9 G/DL — LOW (ref 11.1–15.9)
CBC, PLATELET, NO DIFFERENTIAL: MCH: 25.5 PG — LOW (ref 26.6–33)
CBC, PLATELET, NO DIFFERENTIAL: MCHC: 29.9 G/DL — LOW (ref 31.5–35.7)
CBC, PLATELET, NO DIFFERENTIAL: MCV: 85 FL (ref 79–97)
CBC, PLATELET, NO DIFFERENTIAL: NRBC: (no result)
CBC, PLATELET, NO DIFFERENTIAL: PLATELETS: 228 X10E3/UL (ref 150–450)
CBC, PLATELET, NO DIFFERENTIAL: RBC: 4.27 X10E6/UL (ref 3.77–5.28)
CBC, PLATELET, NO DIFFERENTIAL: RDW: 15.1 % (ref 11.7–15.4)
CBC, PLATELET, NO DIFFERENTIAL: WBC: 5.7 X10E3/UL (ref 3.4–10.8)
COMP. METABOLIC PANEL (14): A/G RATIO: 1.3 (ref 1.2–2.2)
COMP. METABOLIC PANEL (14): ALBUMIN: 3.6 G/DL — LOW (ref 3.7–4.7)
COMP. METABOLIC PANEL (14): ALKALINE PHOSPHATASE: 151 IU/L — HIGH (ref 39–117)
COMP. METABOLIC PANEL (14): ALT (SGPT): 18 IU/L (ref 0–32)
COMP. METABOLIC PANEL (14): AST (SGOT): 34 IU/L (ref 0–40)
COMP. METABOLIC PANEL (14): BILIRUBIN, TOTAL: 0.3 MG/DL (ref 0–1.2)
COMP. METABOLIC PANEL (14): BUN/CREATININE RATIO: 12 (ref 12–28)
COMP. METABOLIC PANEL (14): BUN: 13 MG/DL (ref 8–27)
COMP. METABOLIC PANEL (14): CALCIUM: 8.9 MG/DL (ref 8.7–10.3)
COMP. METABOLIC PANEL (14): CARBON DIOXIDE, TOTAL: 24 MMOL/L (ref 20–29)
COMP. METABOLIC PANEL (14): CHLORIDE: 105 MMOL/L (ref 96–106)
COMP. METABOLIC PANEL (14): CREATININE: 1.09 MG/DL — HIGH (ref 0.57–1)
COMP. METABOLIC PANEL (14): EGFR IF AFRICN AM: 58 ML/MIN/1.73 — LOW (ref 59–?)
COMP. METABOLIC PANEL (14): EGFR IF NONAFRICN AM: 50 ML/MIN/1.73 — LOW (ref 59–?)
COMP. METABOLIC PANEL (14): GLOBULIN, TOTAL: 2.8 G/DL (ref 1.5–4.5)
COMP. METABOLIC PANEL (14): GLUCOSE: 127 MG/DL — HIGH (ref 65–99)
COMP. METABOLIC PANEL (14): POTASSIUM: 3.7 MMOL/L (ref 3.5–5.2)
COMP. METABOLIC PANEL (14): PROTEIN, TOTAL: 6.4 G/DL (ref 6–8.5)
COMP. METABOLIC PANEL (14): SODIUM: 142 MMOL/L (ref 134–144)

## 2021-05-10 PROCEDURE — 99214 OFFICE O/P EST MOD 30 MIN: CPT | Performed by: NURSE PRACTITIONER

## 2021-05-10 RX ORDER — URSODIOL 250 MG/1
1500 TABLET ORAL
Qty: 540 | Refills: 3 | Status: ACTIVE

## 2021-05-10 RX ORDER — COLESTIPOL HYDROCHLORIDE 1 G/1
2 TABLET ORAL
Qty: 180 | Refills: 4 | Status: COMPLETED
Start: 2020-11-16 | End: 2021-07-26

## 2021-07-26 ENCOUNTER — OFFICE (OUTPATIENT)
Dept: URBAN - METROPOLITAN AREA CLINIC 8 | Facility: CLINIC | Age: 76
End: 2021-07-26

## 2021-07-26 VITALS
HEIGHT: 64 IN | DIASTOLIC BLOOD PRESSURE: 84 MMHG | SYSTOLIC BLOOD PRESSURE: 140 MMHG | HEART RATE: 56 BPM | WEIGHT: 161 LBS | OXYGEN SATURATION: 97 %

## 2021-07-26 DIAGNOSIS — K21.9 GASTRO-ESOPHAGEAL REFLUX DISEASE WITHOUT ESOPHAGITIS: ICD-10-CM

## 2021-07-26 DIAGNOSIS — L29.9 PRURITUS, UNSPECIFIED: ICD-10-CM

## 2021-07-26 DIAGNOSIS — K74.3 PRIMARY BILIARY CIRRHOSIS: ICD-10-CM

## 2021-07-26 DIAGNOSIS — R13.10 DYSPHAGIA, UNSPECIFIED: ICD-10-CM

## 2021-07-26 PROCEDURE — 99214 OFFICE O/P EST MOD 30 MIN: CPT | Performed by: INTERNAL MEDICINE

## 2021-07-26 RX ORDER — CYPROHEPTADINE HYDROCHLORIDE 4 MG/1
4 TABLET ORAL
Qty: 90 | Refills: 0 | Status: COMPLETED
End: 2022-05-09

## 2021-11-15 ENCOUNTER — OFFICE (OUTPATIENT)
Dept: URBAN - METROPOLITAN AREA CLINIC 8 | Facility: CLINIC | Age: 76
End: 2021-11-15
Payer: MEDICARE

## 2021-11-15 VITALS
HEIGHT: 64 IN | WEIGHT: 157 LBS | OXYGEN SATURATION: 99 % | SYSTOLIC BLOOD PRESSURE: 156 MMHG | HEART RATE: 66 BPM | DIASTOLIC BLOOD PRESSURE: 79 MMHG

## 2021-11-15 DIAGNOSIS — K52.9 NONINFECTIVE GASTROENTERITIS AND COLITIS, UNSPECIFIED: ICD-10-CM

## 2021-11-15 DIAGNOSIS — R13.10 DYSPHAGIA, UNSPECIFIED: ICD-10-CM

## 2021-11-15 DIAGNOSIS — K74.3 PRIMARY BILIARY CIRRHOSIS: ICD-10-CM

## 2021-11-15 LAB
CBC, PLATELET, NO DIFFERENTIAL: HEMATOCRIT: 35 % (ref 34–46.6)
CBC, PLATELET, NO DIFFERENTIAL: HEMOGLOBIN: 10.8 G/DL — LOW (ref 11.1–15.9)
CBC, PLATELET, NO DIFFERENTIAL: MCH: 26.5 PG — LOW (ref 26.6–33)
CBC, PLATELET, NO DIFFERENTIAL: MCHC: 30.9 G/DL — LOW (ref 31.5–35.7)
CBC, PLATELET, NO DIFFERENTIAL: MCV: 86 FL (ref 79–97)
CBC, PLATELET, NO DIFFERENTIAL: PLATELETS: 237 X10E3/UL (ref 150–450)
CBC, PLATELET, NO DIFFERENTIAL: RBC: 4.07 X10E6/UL (ref 3.77–5.28)
CBC, PLATELET, NO DIFFERENTIAL: RDW: 15.2 % (ref 11.7–15.4)
CBC, PLATELET, NO DIFFERENTIAL: WBC: 5.8 X10E3/UL (ref 3.4–10.8)
COMP. METABOLIC PANEL (14): A/G RATIO: 1.3 (ref 1.2–2.2)
COMP. METABOLIC PANEL (14): ALBUMIN: 3.9 G/DL (ref 3.7–4.7)
COMP. METABOLIC PANEL (14): ALKALINE PHOSPHATASE: 157 IU/L — HIGH (ref 44–121)
COMP. METABOLIC PANEL (14): ALT (SGPT): 31 IU/L (ref 0–32)
COMP. METABOLIC PANEL (14): AST (SGOT): 76 IU/L — HIGH (ref 0–40)
COMP. METABOLIC PANEL (14): BILIRUBIN, TOTAL: 0.3 MG/DL (ref 0–1.2)
COMP. METABOLIC PANEL (14): BUN/CREATININE RATIO: 14 (ref 12–28)
COMP. METABOLIC PANEL (14): BUN: 13 MG/DL (ref 8–27)
COMP. METABOLIC PANEL (14): CALCIUM: 9.4 MG/DL (ref 8.7–10.3)
COMP. METABOLIC PANEL (14): CARBON DIOXIDE, TOTAL: 23 MMOL/L (ref 20–29)
COMP. METABOLIC PANEL (14): CHLORIDE: 106 MMOL/L (ref 96–106)
COMP. METABOLIC PANEL (14): CREATININE: 0.96 MG/DL (ref 0.57–1)
COMP. METABOLIC PANEL (14): EGFR IF AFRICN AM: 67 ML/MIN/1.73 (ref 59–?)
COMP. METABOLIC PANEL (14): EGFR IF NONAFRICN AM: 58 ML/MIN/1.73 — LOW (ref 59–?)
COMP. METABOLIC PANEL (14): GLOBULIN, TOTAL: 3.1 G/DL (ref 1.5–4.5)
COMP. METABOLIC PANEL (14): GLUCOSE: 122 MG/DL — HIGH (ref 65–99)
COMP. METABOLIC PANEL (14): POTASSIUM: 3.9 MMOL/L (ref 3.5–5.2)
COMP. METABOLIC PANEL (14): PROTEIN, TOTAL: 7 G/DL (ref 6–8.5)
COMP. METABOLIC PANEL (14): SODIUM: 144 MMOL/L (ref 134–144)

## 2021-11-15 PROCEDURE — 99214 OFFICE O/P EST MOD 30 MIN: CPT | Performed by: NURSE PRACTITIONER

## 2021-11-15 RX ORDER — CYPROHEPTADINE HYDROCHLORIDE 4 MG/1
4 TABLET ORAL
Qty: 90 | Refills: 0 | Status: COMPLETED
End: 2022-05-09

## 2021-11-15 RX ORDER — OMEPRAZOLE 40 MG/1
CAPSULE, DELAYED RELEASE ORAL
Qty: 90 | Refills: 3 | Status: ACTIVE

## 2021-11-15 RX ORDER — URSODIOL 250 MG/1
1500 TABLET ORAL
Qty: 540 | Refills: 3 | Status: ACTIVE

## 2022-05-09 ENCOUNTER — OFFICE (OUTPATIENT)
Dept: URBAN - METROPOLITAN AREA CLINIC 8 | Facility: CLINIC | Age: 77
End: 2022-05-09
Payer: MEDICARE

## 2022-05-09 VITALS
HEIGHT: 64 IN | SYSTOLIC BLOOD PRESSURE: 146 MMHG | WEIGHT: 157 LBS | OXYGEN SATURATION: 98 % | HEART RATE: 79 BPM | DIASTOLIC BLOOD PRESSURE: 87 MMHG

## 2022-05-09 DIAGNOSIS — K52.9 NONINFECTIVE GASTROENTERITIS AND COLITIS, UNSPECIFIED: ICD-10-CM

## 2022-05-09 DIAGNOSIS — K21.9 GASTRO-ESOPHAGEAL REFLUX DISEASE WITHOUT ESOPHAGITIS: ICD-10-CM

## 2022-05-09 DIAGNOSIS — K74.3 PRIMARY BILIARY CIRRHOSIS: ICD-10-CM

## 2022-05-09 LAB
CBC, PLATELET, NO DIFFERENTIAL: HEMATOCRIT: 42.8 % (ref 34–46.6)
CBC, PLATELET, NO DIFFERENTIAL: HEMOGLOBIN: 13.1 G/DL (ref 11.1–15.9)
CBC, PLATELET, NO DIFFERENTIAL: MCH: 28 PG (ref 26.6–33)
CBC, PLATELET, NO DIFFERENTIAL: MCHC: 30.6 G/DL — LOW (ref 31.5–35.7)
CBC, PLATELET, NO DIFFERENTIAL: MCV: 92 FL (ref 79–97)
CBC, PLATELET, NO DIFFERENTIAL: PLATELETS: 186 X10E3/UL (ref 150–450)
CBC, PLATELET, NO DIFFERENTIAL: RBC: 4.68 X10E6/UL (ref 3.77–5.28)
CBC, PLATELET, NO DIFFERENTIAL: RDW: 13.1 % (ref 11.7–15.4)
CBC, PLATELET, NO DIFFERENTIAL: WBC: 6.1 X10E3/UL (ref 3.4–10.8)
COMP. METABOLIC PANEL (14): A/G RATIO: 1.2 (ref 1.2–2.2)
COMP. METABOLIC PANEL (14): ALBUMIN: 3.9 G/DL (ref 3.7–4.7)
COMP. METABOLIC PANEL (14): ALKALINE PHOSPHATASE: 160 IU/L — HIGH (ref 44–121)
COMP. METABOLIC PANEL (14): ALT (SGPT): 25 IU/L (ref 0–32)
COMP. METABOLIC PANEL (14): AST (SGOT): 42 IU/L — HIGH (ref 0–40)
COMP. METABOLIC PANEL (14): BILIRUBIN, TOTAL: 0.2 MG/DL (ref 0–1.2)
COMP. METABOLIC PANEL (14): BUN/CREATININE RATIO: 22 (ref 12–28)
COMP. METABOLIC PANEL (14): BUN: 24 MG/DL (ref 8–27)
COMP. METABOLIC PANEL (14): CALCIUM: 9.8 MG/DL (ref 8.7–10.3)
COMP. METABOLIC PANEL (14): CARBON DIOXIDE, TOTAL: 23 MMOL/L (ref 20–29)
COMP. METABOLIC PANEL (14): CHLORIDE: 109 MMOL/L — HIGH (ref 96–106)
COMP. METABOLIC PANEL (14): CREATININE: 1.11 MG/DL — HIGH (ref 0.57–1)
COMP. METABOLIC PANEL (14): EGFR: 52 ML/MIN/1.73 — LOW (ref 59–?)
COMP. METABOLIC PANEL (14): GLOBULIN, TOTAL: 3.3 G/DL (ref 1.5–4.5)
COMP. METABOLIC PANEL (14): GLUCOSE: 112 MG/DL — HIGH (ref 65–99)
COMP. METABOLIC PANEL (14): POTASSIUM: 3.6 MMOL/L (ref 3.5–5.2)
COMP. METABOLIC PANEL (14): PROTEIN, TOTAL: 7.2 G/DL (ref 6–8.5)
COMP. METABOLIC PANEL (14): SODIUM: 147 MMOL/L — HIGH (ref 134–144)

## 2022-05-09 PROCEDURE — 99214 OFFICE O/P EST MOD 30 MIN: CPT | Performed by: NURSE PRACTITIONER

## 2022-05-09 RX ORDER — OMEPRAZOLE 40 MG/1
CAPSULE, DELAYED RELEASE ORAL
Qty: 90 | Refills: 3 | Status: ACTIVE

## 2022-05-09 RX ORDER — CYPROHEPTADINE HYDROCHLORIDE 4 MG/1
TABLET ORAL
Qty: 90 | Refills: 4 | Status: ACTIVE

## 2022-05-09 RX ORDER — URSODIOL 250 MG/1
1500 TABLET ORAL
Qty: 540 | Refills: 3 | Status: ACTIVE

## 2022-05-09 NOTE — INTERFACERESULTNOTES
Please let the patient know that her labs in regards to her liver are stable. I would have her follow up with her PCP in regards to a mildly elevated creatinine.

## 2023-01-23 ENCOUNTER — OFFICE (OUTPATIENT)
Dept: URBAN - METROPOLITAN AREA CLINIC 8 | Facility: CLINIC | Age: 78
End: 2023-01-23

## 2023-01-23 VITALS
WEIGHT: 161 LBS | DIASTOLIC BLOOD PRESSURE: 717 MMHG | SYSTOLIC BLOOD PRESSURE: 133 MMHG | HEART RATE: 2 BPM | HEIGHT: 64 IN

## 2023-01-23 DIAGNOSIS — K86.2 CYST OF PANCREAS: ICD-10-CM

## 2023-01-23 DIAGNOSIS — K74.3 PRIMARY BILIARY CIRRHOSIS: ICD-10-CM

## 2023-01-23 DIAGNOSIS — R19.7 DIARRHEA, UNSPECIFIED: ICD-10-CM

## 2023-01-23 DIAGNOSIS — K21.9 GASTRO-ESOPHAGEAL REFLUX DISEASE WITHOUT ESOPHAGITIS: ICD-10-CM

## 2023-01-23 DIAGNOSIS — K75.81 NONALCOHOLIC STEATOHEPATITIS (NASH): ICD-10-CM

## 2023-01-23 PROCEDURE — 99214 OFFICE O/P EST MOD 30 MIN: CPT | Performed by: INTERNAL MEDICINE

## 2023-01-23 RX ORDER — URSODIOL 250 MG/1
1500 TABLET ORAL
Qty: 540 | Refills: 3 | Status: ACTIVE

## 2024-04-22 ENCOUNTER — OFFICE (OUTPATIENT)
Dept: URBAN - METROPOLITAN AREA CLINIC 9 | Facility: CLINIC | Age: 79
End: 2024-04-22
Payer: MEDICARE

## 2024-04-22 VITALS
HEIGHT: 64 IN | WEIGHT: 143 LBS | HEART RATE: 58 BPM | DIASTOLIC BLOOD PRESSURE: 92 MMHG | SYSTOLIC BLOOD PRESSURE: 172 MMHG

## 2024-04-22 DIAGNOSIS — R19.7 DIARRHEA, UNSPECIFIED: ICD-10-CM

## 2024-04-22 DIAGNOSIS — K86.2 CYST OF PANCREAS: ICD-10-CM

## 2024-04-22 DIAGNOSIS — K75.81 NONALCOHOLIC STEATOHEPATITIS (NASH): ICD-10-CM

## 2024-04-22 DIAGNOSIS — K21.9 GASTRO-ESOPHAGEAL REFLUX DISEASE WITHOUT ESOPHAGITIS: ICD-10-CM

## 2024-04-22 DIAGNOSIS — K74.3 PRIMARY BILIARY CIRRHOSIS: ICD-10-CM

## 2024-04-22 PROCEDURE — 99214 OFFICE O/P EST MOD 30 MIN: CPT | Performed by: NURSE PRACTITIONER

## 2024-04-22 RX ORDER — URSODIOL 250 MG/1
1500 TABLET ORAL
Qty: 540 | Refills: 3 | Status: ACTIVE

## 2024-07-05 ENCOUNTER — EMERGENCY ROOM – HOSPITAL (OUTPATIENT)
Dept: URBAN - METROPOLITAN AREA HOSPITAL 96 | Facility: HOSPITAL | Age: 79
End: 2024-07-05
Payer: MEDICARE

## 2024-07-05 DIAGNOSIS — K22.2 ESOPHAGEAL OBSTRUCTION: ICD-10-CM

## 2024-07-05 PROCEDURE — 99222 1ST HOSP IP/OBS MODERATE 55: CPT | Mod: 25 | Performed by: INTERNAL MEDICINE

## 2024-07-05 PROCEDURE — 43247 EGD REMOVE FOREIGN BODY: CPT | Performed by: INTERNAL MEDICINE

## 2025-08-20 ENCOUNTER — OFFICE (OUTPATIENT)
Dept: URBAN - METROPOLITAN AREA CLINIC 9 | Facility: CLINIC | Age: 80
End: 2025-08-20
Payer: MEDICARE

## 2025-08-20 VITALS
HEIGHT: 64 IN | HEART RATE: 68 BPM | DIASTOLIC BLOOD PRESSURE: 83 MMHG | SYSTOLIC BLOOD PRESSURE: 146 MMHG | SYSTOLIC BLOOD PRESSURE: 129 MMHG | WEIGHT: 149 LBS | OXYGEN SATURATION: 98 % | DIASTOLIC BLOOD PRESSURE: 87 MMHG

## 2025-08-20 DIAGNOSIS — K21.9 GASTRO-ESOPHAGEAL REFLUX DISEASE WITHOUT ESOPHAGITIS: ICD-10-CM

## 2025-08-20 DIAGNOSIS — K52.9 NONINFECTIVE GASTROENTERITIS AND COLITIS, UNSPECIFIED: ICD-10-CM

## 2025-08-20 DIAGNOSIS — K74.3 PRIMARY BILIARY CIRRHOSIS: ICD-10-CM

## 2025-08-20 PROCEDURE — 99214 OFFICE O/P EST MOD 30 MIN: CPT | Performed by: NURSE PRACTITIONER

## 2025-08-20 RX ORDER — URSODIOL 250 MG/1
1000 TABLET ORAL
Qty: 360 | Refills: 3 | Status: ACTIVE

## 2025-08-21 LAB
AFP, SERUM, TUMOR MARKER: 2.1 NG/ML (ref 0–9.2)
CBC, PLATELET, NO DIFFERENTIAL: HEMATOCRIT: 38.2 % (ref 34–46.6)
CBC, PLATELET, NO DIFFERENTIAL: HEMOGLOBIN: 11.7 G/DL (ref 11.1–15.9)
CBC, PLATELET, NO DIFFERENTIAL: MCH: 28.1 PG (ref 26.6–33)
CBC, PLATELET, NO DIFFERENTIAL: MCHC: 30.6 G/DL — LOW (ref 31.5–35.7)
CBC, PLATELET, NO DIFFERENTIAL: MCV: 92 FL (ref 79–97)
CBC, PLATELET, NO DIFFERENTIAL: PLATELETS: 203 X10E3/UL (ref 150–450)
CBC, PLATELET, NO DIFFERENTIAL: RBC: 4.16 X10E6/UL (ref 3.77–5.28)
CBC, PLATELET, NO DIFFERENTIAL: RDW: 13.9 % (ref 11.7–15.4)
CBC, PLATELET, NO DIFFERENTIAL: WBC: 7.3 X10E3/UL (ref 3.4–10.8)
COMP. METABOLIC PANEL (14): ALBUMIN: 3.6 G/DL — LOW (ref 3.8–4.8)
COMP. METABOLIC PANEL (14): ALKALINE PHOSPHATASE: 104 IU/L (ref 44–121)
COMP. METABOLIC PANEL (14): ALT (SGPT): 11 IU/L (ref 0–32)
COMP. METABOLIC PANEL (14): AST (SGOT): 20 IU/L (ref 0–40)
COMP. METABOLIC PANEL (14): BILIRUBIN, TOTAL: 0.3 MG/DL (ref 0–1.2)
COMP. METABOLIC PANEL (14): BUN/CREATININE RATIO: 13 (ref 12–28)
COMP. METABOLIC PANEL (14): BUN: 13 MG/DL (ref 8–27)
COMP. METABOLIC PANEL (14): CALCIUM: 9.7 MG/DL (ref 8.7–10.3)
COMP. METABOLIC PANEL (14): CARBON DIOXIDE, TOTAL: 24 MMOL/L (ref 20–29)
COMP. METABOLIC PANEL (14): CHLORIDE: 106 MMOL/L (ref 96–106)
COMP. METABOLIC PANEL (14): CREATININE: 0.98 MG/DL (ref 0.57–1)
COMP. METABOLIC PANEL (14): EGFR: 59 ML/MIN/1.73 — LOW (ref 59–?)
COMP. METABOLIC PANEL (14): GLOBULIN, TOTAL: 2.8 G/DL (ref 1.5–4.5)
COMP. METABOLIC PANEL (14): GLUCOSE: 96 MG/DL (ref 70–99)
COMP. METABOLIC PANEL (14): POTASSIUM: 4.3 MMOL/L (ref 3.5–5.2)
COMP. METABOLIC PANEL (14): PROTEIN, TOTAL: 6.4 G/DL (ref 6–8.5)
COMP. METABOLIC PANEL (14): SODIUM: 144 MMOL/L (ref 134–144)
PROTHROMBIN TIME (PT): INR: 0.9 (ref 0.9–1.2)
PROTHROMBIN TIME (PT): PROTHROMBIN TIME: 10.3 SEC (ref 9.1–12)